# Patient Record
Sex: MALE | Race: WHITE | NOT HISPANIC OR LATINO | URBAN - METROPOLITAN AREA
[De-identification: names, ages, dates, MRNs, and addresses within clinical notes are randomized per-mention and may not be internally consistent; named-entity substitution may affect disease eponyms.]

---

## 2017-12-14 ENCOUNTER — EMERGENCY (EMERGENCY)
Facility: HOSPITAL | Age: 82
LOS: 1 days | Discharge: HOME | End: 2017-12-14
Attending: EMERGENCY MEDICINE | Admitting: EMERGENCY MEDICINE
Payer: MEDICARE

## 2017-12-14 VITALS
DIASTOLIC BLOOD PRESSURE: 89 MMHG | OXYGEN SATURATION: 97 % | RESPIRATION RATE: 18 BRPM | TEMPERATURE: 98 F | SYSTOLIC BLOOD PRESSURE: 187 MMHG | HEART RATE: 57 BPM

## 2017-12-14 VITALS
RESPIRATION RATE: 18 BRPM | TEMPERATURE: 98 F | OXYGEN SATURATION: 92 % | HEART RATE: 69 BPM | SYSTOLIC BLOOD PRESSURE: 182 MMHG | DIASTOLIC BLOOD PRESSURE: 89 MMHG

## 2017-12-14 LAB
ALBUMIN SERPL ELPH-MCNC: 4.3 G/DL — SIGNIFICANT CHANGE UP (ref 3.3–5)
ALP SERPL-CCNC: 69 U/L — SIGNIFICANT CHANGE UP (ref 40–120)
ALT FLD-CCNC: 10 U/L — SIGNIFICANT CHANGE UP (ref 4–41)
AST SERPL-CCNC: 19 U/L — SIGNIFICANT CHANGE UP (ref 4–40)
BASE EXCESS BLDV CALC-SCNC: 4.8 MMOL/L — SIGNIFICANT CHANGE UP
BASOPHILS # BLD AUTO: 0.1 K/UL — SIGNIFICANT CHANGE UP (ref 0–0.2)
BASOPHILS NFR BLD AUTO: 1.7 % — SIGNIFICANT CHANGE UP (ref 0–2)
BILIRUB SERPL-MCNC: 0.9 MG/DL — SIGNIFICANT CHANGE UP (ref 0.2–1.2)
BLOOD GAS VENOUS - CREATININE: 1.31 MG/DL — HIGH (ref 0.5–1.3)
BUN SERPL-MCNC: 31 MG/DL — HIGH (ref 7–23)
CALCIUM SERPL-MCNC: 8.5 MG/DL — SIGNIFICANT CHANGE UP (ref 8.4–10.5)
CHLORIDE BLDV-SCNC: 105 MMOL/L — SIGNIFICANT CHANGE UP (ref 96–108)
CHLORIDE SERPL-SCNC: 101 MMOL/L — SIGNIFICANT CHANGE UP (ref 98–107)
CO2 SERPL-SCNC: 28 MMOL/L — SIGNIFICANT CHANGE UP (ref 22–31)
CREAT SERPL-MCNC: 1.44 MG/DL — HIGH (ref 0.5–1.3)
EOSINOPHIL # BLD AUTO: 0.21 K/UL — SIGNIFICANT CHANGE UP (ref 0–0.5)
EOSINOPHIL NFR BLD AUTO: 3.5 % — SIGNIFICANT CHANGE UP (ref 0–6)
GAS PNL BLDV: 138 MMOL/L — SIGNIFICANT CHANGE UP (ref 136–146)
GLUCOSE BLDV-MCNC: 87 — SIGNIFICANT CHANGE UP (ref 70–99)
GLUCOSE SERPL-MCNC: 86 MG/DL — SIGNIFICANT CHANGE UP (ref 70–99)
HBA1C BLD-MCNC: 6.3 % — HIGH (ref 4–5.6)
HCO3 BLDV-SCNC: 27 MMOL/L — SIGNIFICANT CHANGE UP (ref 20–27)
HCT VFR BLD CALC: 36.4 % — LOW (ref 39–50)
HCT VFR BLDV CALC: 34.1 % — LOW (ref 39–51)
HGB BLD-MCNC: 11.3 G/DL — LOW (ref 13–17)
HGB BLDV-MCNC: 11.1 G/DL — LOW (ref 13–17)
IMM GRANULOCYTES # BLD AUTO: 0.02 # — SIGNIFICANT CHANGE UP
IMM GRANULOCYTES NFR BLD AUTO: 0.3 % — SIGNIFICANT CHANGE UP (ref 0–1.5)
INR BLD: 1.86 — HIGH (ref 0.88–1.17)
LACTATE BLDV-MCNC: 1.2 MMOL/L — SIGNIFICANT CHANGE UP (ref 0.5–2)
LYMPHOCYTES # BLD AUTO: 0.9 K/UL — LOW (ref 1–3.3)
LYMPHOCYTES # BLD AUTO: 14.9 % — SIGNIFICANT CHANGE UP (ref 13–44)
MCHC RBC-ENTMCNC: 27.8 PG — SIGNIFICANT CHANGE UP (ref 27–34)
MCHC RBC-ENTMCNC: 31 % — LOW (ref 32–36)
MCV RBC AUTO: 89.7 FL — SIGNIFICANT CHANGE UP (ref 80–100)
MONOCYTES # BLD AUTO: 0.56 K/UL — SIGNIFICANT CHANGE UP (ref 0–0.9)
MONOCYTES NFR BLD AUTO: 9.2 % — SIGNIFICANT CHANGE UP (ref 2–14)
NEUTROPHILS # BLD AUTO: 4.27 K/UL — SIGNIFICANT CHANGE UP (ref 1.8–7.4)
NEUTROPHILS NFR BLD AUTO: 70.4 % — SIGNIFICANT CHANGE UP (ref 43–77)
NRBC # FLD: 0 — SIGNIFICANT CHANGE UP
PCO2 BLDV: 53 MMHG — HIGH (ref 41–51)
PH BLDV: 7.37 PH — SIGNIFICANT CHANGE UP (ref 7.32–7.43)
PLATELET # BLD AUTO: 192 K/UL — SIGNIFICANT CHANGE UP (ref 150–400)
PMV BLD: 10.8 FL — SIGNIFICANT CHANGE UP (ref 7–13)
PO2 BLDV: 35 MMHG — SIGNIFICANT CHANGE UP (ref 35–40)
POTASSIUM BLDV-SCNC: 4.5 MMOL/L — SIGNIFICANT CHANGE UP (ref 3.4–4.5)
POTASSIUM SERPL-MCNC: 3.8 MMOL/L — SIGNIFICANT CHANGE UP (ref 3.5–5.3)
POTASSIUM SERPL-SCNC: 3.8 MMOL/L — SIGNIFICANT CHANGE UP (ref 3.5–5.3)
PROT SERPL-MCNC: 8.3 G/DL — SIGNIFICANT CHANGE UP (ref 6–8.3)
PROTHROM AB SERPL-ACNC: 20.9 SEC — HIGH (ref 9.8–13.1)
RBC # BLD: 4.06 M/UL — LOW (ref 4.2–5.8)
RBC # FLD: 16 % — HIGH (ref 10.3–14.5)
SAO2 % BLDV: 60.1 % — SIGNIFICANT CHANGE UP (ref 60–85)
SODIUM SERPL-SCNC: 143 MMOL/L — SIGNIFICANT CHANGE UP (ref 135–145)
TROPONIN T SERPL-MCNC: < 0.06 NG/ML — SIGNIFICANT CHANGE UP (ref 0–0.06)
WBC # BLD: 6.06 K/UL — SIGNIFICANT CHANGE UP (ref 3.8–10.5)
WBC # FLD AUTO: 6.06 K/UL — SIGNIFICANT CHANGE UP (ref 3.8–10.5)

## 2017-12-14 PROCEDURE — 99218: CPT | Mod: GC

## 2017-12-14 PROCEDURE — 71020: CPT | Mod: 26

## 2017-12-14 PROCEDURE — 70450 CT HEAD/BRAIN W/O DYE: CPT | Mod: 26

## 2017-12-14 RX ORDER — METOPROLOL TARTRATE 50 MG
25 TABLET ORAL ONCE
Qty: 0 | Refills: 0 | Status: COMPLETED | OUTPATIENT
Start: 2017-12-14 | End: 2017-12-14

## 2017-12-14 RX ORDER — FUROSEMIDE 40 MG
40 TABLET ORAL ONCE
Qty: 0 | Refills: 0 | Status: COMPLETED | OUTPATIENT
Start: 2017-12-14 | End: 2017-12-14

## 2017-12-14 RX ADMIN — Medication 25 MILLIGRAM(S): at 10:09

## 2017-12-14 RX ADMIN — Medication 40 MILLIGRAM(S): at 10:09

## 2017-12-14 NOTE — ED CDU PROVIDER INITIAL DAY NOTE - PROGRESS NOTE DETAILS
pt axox3. Neurovascullary intact. CN 2-12 intact. EOMs intact. 5/5 strength of UE and LE b/l. No pronator drift. Ambulatory without assistance. Sensation intact throughout. sent to CDU for neuro checks, abd observation for few hours, plan to d/c.

## 2017-12-14 NOTE — ED CDU PROVIDER DISPOSITION NOTE - CLINICAL COURSE
83 y/o male with h/o htn, hld, afib xarelto, dm s/p mech fall with head trauma, no loc, no neuro deficits.  Neg head ct in Main ED.  Sent to CDU for neuro checks.  No ha, neuro deficits while in CDU.  Discharged home.

## 2017-12-14 NOTE — ED PROVIDER NOTE - MEDICAL DECISION MAKING DETAILS
Fall on xarelto, normal exam- will do ct head, labs, cxr 2/2 hypoxia at home. Pt in NAD, asymptomatic

## 2017-12-14 NOTE — ED CDU PROVIDER INITIAL DAY NOTE - MEDICAL DECISION MAKING DETAILS
81 y/o male on xarelto s/p mech fall with head trauma, neg CT head in ED, here for neuro checks, antic dc at approx 6pm.

## 2017-12-14 NOTE — ED PROVIDER NOTE - OBJECTIVE STATEMENT
82 year old male, PMHx of HTN, HLD, Afib on Xarelto, DM; presents to the ED s/p fall. Patient states he came downstairs from bed early this morning. He was sitting in a folding chair and fell asleep. He awoke as he was falling sideways to the floor. He landed on his side, hitting the right side of his head on the wood floor. He remained conscious and awake throughout the fall and after impact. He called EMS to help him get up. Upon arrival- EMS found him to be hypertensive 200s/100s, and O2 sat in the mid 80s. Patient states he used to be on home 02 after a discharge from [place] but has not continued to use it. He states he has otherwise been feeling well, has not taken medications today. He denies fevers, chills, nausea, vomiting, diarrhea, numbness, tingling, weakness, headache, change in vision/hearing, or other complaints. Pt currently without complaints or symptoms.

## 2017-12-14 NOTE — ED CDU PROVIDER INITIAL DAY NOTE - MUSCULOSKELETAL, MLM
Spine appears normal, range of motion is not limited, no muscle or joint tenderness.  Chronic skin changes to b/l lower extremities

## 2017-12-14 NOTE — ED PROVIDER NOTE - PROGRESS NOTE DETAILS
PA Baseil: Pt labs wnl, ct head no acute findings. Pt has remained in low 90s pulse ox, likely chronic, pt in NAD. Pt agreeable to CDU for neuro checks and likely later discharge.

## 2017-12-14 NOTE — ED CDU PROVIDER DISPOSITION NOTE - ATTENDING CONTRIBUTION TO CARE
CDU MD TRUONG:  I performed a face to face bedside interview with patient regarding history of present illness, review of symptoms and past medical history. I completed an independent physical exam.  I have discussed patient's plan of care with PA.   I agree with note as stated above, having amended the EMR as needed to reflect my findings. I have discussed the assessment and plan of care.  This includes during the time I functioned as the attending physician for this patient.    This note was written by myself.

## 2017-12-14 NOTE — ED ADULT NURSE NOTE - OBJECTIVE STATEMENT
Pt rc'd to room 11 by EMS post fall this morning. Pt states he was sitting in his folding chair and "must have fell asleep when he fell over" from his chair and hit his head, patient states he did not lose consciousness. Pt A&Ox3, ambulatory, lives alone, hx of diabetes, HTN, HLD, A fib currently takes xarelto. Pt denies CP, SOB, headache, dizziness, vision changes, nausea, vomiting. Pt has ROM in all extremities with no obvious deformities, skin is intact. MD evaluated, IV 20G placed to L wrist, bloods drawn & sent, A fib on monitor, will monitor Pt rc'd to room 11 by EMS post fall this morning. Pt states he was sitting in his folding chair and "must have fell asleep when he fell over" from his chair and hit his head, patient states he did not lose consciousness. Pt A&Ox3, ambulatory, lives alone, hx of diabetes, HTN, HLD, A fib currently takes xarelto. Pt denies CP, SOB, headache, dizziness, vision changes, nausea, vomiting. Pt has ROM in all extremities with no obvious deformities, skin is intact. Pt awake& alert, no neuro deficits, pupils equal & reactive. Baseline sat was 88%, fingertips slightly discolored, MD evaluated, pt in NAD, respirations even & unlabored. IV 20G placed to L wrist, bloods drawn & sent, A fib on monitor, will monitor

## 2017-12-14 NOTE — ED PROVIDER NOTE - ATTENDING CONTRIBUTION TO CARE
ED Attending (Jose Antonio OLSON): I have personally performed a face to face bedside history and physical examination of this patient. I have discussed the history, examination, assessment and plan of management with the Physician Assistant. My findings include: 82 year old male, PMHx of HTN, HLD, Afib on Xarelto, DM, previously on home O2 but d/nila it himself, presents to the ED s/p fall. Patient states he came downstairs from bed early this morning. He was sitting in a folding chair and fell asleep. He awoke as he was falling sideways to the floor. He landed on his side, hitting the right side of his head on the wood floor. He remained conscious and awake throughout the fall and after impact. He called EMS to help him get up. Upon arrival- EMS found him to be hypertensive 200s/100s, and O2 sat in the mid 80s. Patient states he used to be on home 02 for several months after a discharge from hospital, but has not continued to use it. He states he has otherwise been feeling well, has not taken medications today. He denies fevers, chills, nausea, vomiting, diarrhea, numbness, tingling, weakness, headache, change in vision/hearing, or other complaints. Pt currently without complaints or symptoms. On exam: in no distress, normal mental status, VS as noted sat 89% RA, no resp distress, elevated BP noted. No evidence of head injury, non focal neuro exam, lungs clear heart sounds normal. IMP mechanical fall in elderly male on Xeralto for a fib and h/o other chronic medical problems. We will check labs, EKG CT head and CXR

## 2017-12-14 NOTE — ED PROVIDER NOTE - CHPI ED SYMPTOMS NEG
no fever/no tingling/no weakness/no abrasion/no confusion/no bleeding/no loss of consciousness/no numbness/no deformity/no vomiting

## 2017-12-14 NOTE — ED CDU PROVIDER INITIAL DAY NOTE - ATTENDING CONTRIBUTION TO CARE
CDU MD TRUONG:  I performed a face to face bedside interview with patient regarding history of present illness, review of symptoms and past medical history. I completed an independent physical exam.  I have discussed patient's plan of care with PA.   I agree with note as stated above, having amended the EMR as needed to reflect my findings. I have discussed the assessment and plan of care.  This includes during the time I functioned as the attending physician for this patient.    HPI / ROS / PE / MDM written by myself.

## 2017-12-14 NOTE — ED CDU PROVIDER INITIAL DAY NOTE - OBJECTIVE STATEMENT
81 y/o male on xarelto for afib here after mechanical fall with head trauma.  Denies loc, vision changes, ha, neck pain, numbness/tingling/weakness to arms/legs, balance problems.  CT head negative for ich in ED, sent to CDU for neuro-checks.

## 2018-05-25 ENCOUNTER — EMERGENCY (EMERGENCY)
Facility: HOSPITAL | Age: 83
LOS: 1 days | Discharge: HOME | End: 2018-05-25
Attending: EMERGENCY MEDICINE | Admitting: EMERGENCY MEDICINE
Payer: MEDICARE

## 2018-05-25 VITALS
HEART RATE: 51 BPM | SYSTOLIC BLOOD PRESSURE: 138 MMHG | OXYGEN SATURATION: 100 % | TEMPERATURE: 99 F | RESPIRATION RATE: 16 BRPM | DIASTOLIC BLOOD PRESSURE: 74 MMHG

## 2018-05-25 VITALS
SYSTOLIC BLOOD PRESSURE: 137 MMHG | OXYGEN SATURATION: 95 % | DIASTOLIC BLOOD PRESSURE: 78 MMHG | HEART RATE: 53 BPM | RESPIRATION RATE: 20 BRPM

## 2018-05-25 LAB
ALBUMIN SERPL ELPH-MCNC: 3.7 G/DL — SIGNIFICANT CHANGE UP (ref 3.3–5)
ALP SERPL-CCNC: 67 U/L — SIGNIFICANT CHANGE UP (ref 40–120)
ALT FLD-CCNC: 8 U/L — SIGNIFICANT CHANGE UP (ref 4–41)
APTT BLD: 45.3 SEC — HIGH (ref 27.5–37.4)
AST SERPL-CCNC: 16 U/L — SIGNIFICANT CHANGE UP (ref 4–40)
BASE EXCESS BLDV CALC-SCNC: 6.2 MMOL/L — SIGNIFICANT CHANGE UP
BASOPHILS # BLD AUTO: 0.07 K/UL — SIGNIFICANT CHANGE UP (ref 0–0.2)
BASOPHILS NFR BLD AUTO: 1.3 % — SIGNIFICANT CHANGE UP (ref 0–2)
BILIRUB SERPL-MCNC: 0.7 MG/DL — SIGNIFICANT CHANGE UP (ref 0.2–1.2)
BLD GP AB SCN SERPL QL: NEGATIVE — SIGNIFICANT CHANGE UP
BLOOD GAS VENOUS - CREATININE: 1.37 MG/DL — HIGH (ref 0.5–1.3)
BUN SERPL-MCNC: 27 MG/DL — HIGH (ref 7–23)
CALCIUM SERPL-MCNC: 8.7 MG/DL — SIGNIFICANT CHANGE UP (ref 8.4–10.5)
CHLORIDE BLDV-SCNC: 103 MMOL/L — SIGNIFICANT CHANGE UP (ref 96–108)
CHLORIDE SERPL-SCNC: 101 MMOL/L — SIGNIFICANT CHANGE UP (ref 98–107)
CO2 SERPL-SCNC: 31 MMOL/L — SIGNIFICANT CHANGE UP (ref 22–31)
CREAT SERPL-MCNC: 1.46 MG/DL — HIGH (ref 0.5–1.3)
EOSINOPHIL # BLD AUTO: 0.22 K/UL — SIGNIFICANT CHANGE UP (ref 0–0.5)
EOSINOPHIL NFR BLD AUTO: 4 % — SIGNIFICANT CHANGE UP (ref 0–6)
GAS PNL BLDV: 140 MMOL/L — SIGNIFICANT CHANGE UP (ref 136–146)
GLUCOSE BLDV-MCNC: 107 — HIGH (ref 70–99)
GLUCOSE SERPL-MCNC: 112 MG/DL — HIGH (ref 70–99)
HCO3 BLDV-SCNC: 28 MMOL/L — HIGH (ref 20–27)
HCT VFR BLD CALC: 34.1 % — LOW (ref 39–50)
HCT VFR BLDV CALC: 33.5 % — LOW (ref 39–51)
HGB BLD-MCNC: 10.8 G/DL — LOW (ref 13–17)
HGB BLDV-MCNC: 10.9 G/DL — LOW (ref 13–17)
IMM GRANULOCYTES # BLD AUTO: 0.01 # — SIGNIFICANT CHANGE UP
IMM GRANULOCYTES NFR BLD AUTO: 0.2 % — SIGNIFICANT CHANGE UP (ref 0–1.5)
INR BLD: 2.15 — HIGH (ref 0.88–1.17)
LACTATE BLDV-MCNC: 1.8 MMOL/L — SIGNIFICANT CHANGE UP (ref 0.5–2)
LYMPHOCYTES # BLD AUTO: 0.72 K/UL — LOW (ref 1–3.3)
LYMPHOCYTES # BLD AUTO: 13.1 % — SIGNIFICANT CHANGE UP (ref 13–44)
MAGNESIUM SERPL-MCNC: 2.1 MG/DL — SIGNIFICANT CHANGE UP (ref 1.6–2.6)
MCHC RBC-ENTMCNC: 28.9 PG — SIGNIFICANT CHANGE UP (ref 27–34)
MCHC RBC-ENTMCNC: 31.7 % — LOW (ref 32–36)
MCV RBC AUTO: 91.2 FL — SIGNIFICANT CHANGE UP (ref 80–100)
MONOCYTES # BLD AUTO: 0.59 K/UL — SIGNIFICANT CHANGE UP (ref 0–0.9)
MONOCYTES NFR BLD AUTO: 10.8 % — SIGNIFICANT CHANGE UP (ref 2–14)
NEUTROPHILS # BLD AUTO: 3.87 K/UL — SIGNIFICANT CHANGE UP (ref 1.8–7.4)
NEUTROPHILS NFR BLD AUTO: 70.6 % — SIGNIFICANT CHANGE UP (ref 43–77)
NRBC # FLD: 0 — SIGNIFICANT CHANGE UP
PCO2 BLDV: 62 MMHG — HIGH (ref 41–51)
PH BLDV: 7.33 PH — SIGNIFICANT CHANGE UP (ref 7.32–7.43)
PHOSPHATE SERPL-MCNC: 2.4 MG/DL — LOW (ref 2.5–4.5)
PLATELET # BLD AUTO: 166 K/UL — SIGNIFICANT CHANGE UP (ref 150–400)
PMV BLD: 10.9 FL — SIGNIFICANT CHANGE UP (ref 7–13)
PO2 BLDV: 30 MMHG — LOW (ref 35–40)
POTASSIUM BLDV-SCNC: 3.5 MMOL/L — SIGNIFICANT CHANGE UP (ref 3.4–4.5)
POTASSIUM SERPL-MCNC: 3.5 MMOL/L — SIGNIFICANT CHANGE UP (ref 3.5–5.3)
POTASSIUM SERPL-SCNC: 3.5 MMOL/L — SIGNIFICANT CHANGE UP (ref 3.5–5.3)
PROT SERPL-MCNC: 7.5 G/DL — SIGNIFICANT CHANGE UP (ref 6–8.3)
PROTHROM AB SERPL-ACNC: 25.1 SEC — HIGH (ref 9.8–13.1)
RBC # BLD: 3.74 M/UL — LOW (ref 4.2–5.8)
RBC # FLD: 15.2 % — HIGH (ref 10.3–14.5)
RH IG SCN BLD-IMP: POSITIVE — SIGNIFICANT CHANGE UP
SAO2 % BLDV: 46.5 % — LOW (ref 60–85)
SODIUM SERPL-SCNC: 141 MMOL/L — SIGNIFICANT CHANGE UP (ref 135–145)
TROPONIN T SERPL-MCNC: < 0.06 NG/ML — SIGNIFICANT CHANGE UP (ref 0–0.06)
WBC # BLD: 5.48 K/UL — SIGNIFICANT CHANGE UP (ref 3.8–10.5)
WBC # FLD AUTO: 5.48 K/UL — SIGNIFICANT CHANGE UP (ref 3.8–10.5)

## 2018-05-25 PROCEDURE — 12011 RPR F/E/E/N/L/M 2.5 CM/<: CPT | Mod: GC

## 2018-05-25 PROCEDURE — 99285 EMERGENCY DEPT VISIT HI MDM: CPT | Mod: 25,GC

## 2018-05-25 PROCEDURE — 72125 CT NECK SPINE W/O DYE: CPT | Mod: 26

## 2018-05-25 PROCEDURE — 70450 CT HEAD/BRAIN W/O DYE: CPT | Mod: 26

## 2018-05-25 PROCEDURE — 71046 X-RAY EXAM CHEST 2 VIEWS: CPT | Mod: 26

## 2018-05-25 PROCEDURE — 93010 ELECTROCARDIOGRAM REPORT: CPT | Mod: 59

## 2018-05-25 NOTE — ED ADULT NURSE NOTE - OBJECTIVE STATEMENT
pt received alert and oriented 3. pt has pmhx of afib on Xarelto. pt is s/p mechanical fall this morning. states that chair fell out from underneath him. pt states he fells and hit the right side of his head and arm. pt denies any loc. pt has right forehead hematoma ,and right elbow skin tear. pt denies pt received alert and oriented 3. pt has pmhx of afib on Xarelto. pt is s/p mechanical fall this morning. states that chair fell out from underneath him. pt states he fells and hit the right side of his head and arm. pt denies any loc. pt has right forehead hematoma.no active bleeding noted  ,and right elbow skin tear. pt denies any chest pain, sob, n/v/d, dizziness, lightheadedness. 20 g placed in left a/c. labs drawn and sent. pt placed on continuous tele monitoring .afib on cm. md at bedside for eval. will continue to monitor. pt received alert and oriented 3. pt has pmhx of afib on Xarelto. pt is s/p mechanical fall this morning. states that chair fell out from underneath him. pt states he fells and hit the right side of his head and arm. pt denies any loc. pt has right forehead hematoma. no active bleeding noted  ,and right elbow skin tear. pt noted to have bilateral leg edema  with dry ,scaly skin. Postitive pedal pulses. pt denies any chest pain, sob, n/v/d, dizziness, lightheadedness. 20 g placed in left a/c. labs drawn and sent. pt placed on continuous tele monitoring .afib on cm. md at bedside for eval. will continue to monitor.

## 2018-05-25 NOTE — ED PROVIDER NOTE - ATTENDING CONTRIBUTION TO CARE
I performed a history and physical exam of the patient and discussed their management with the resident and /or advanced care provider. I reviewed the resident and /or ACP's note and agree with the documented findings and plan of care. My medical decison making and observations are found above.  Neck non tender, rt hematoma on forhead/scalp. no neuro changes. Patient is bradycardic to 51

## 2018-05-25 NOTE — ED PROVIDER NOTE - OBJECTIVE STATEMENT
82m w hx afib on Xarelto, HTN, HLD, DM, here after fall at home. Pt states was going to sit down in chair when "chair gave out" and he fell onto his right side, hitting his head. No LOC. Denies preceding cp, SOB, or lightheadedness. No h/a, visual changes, or vomiting since. C/o r sided neck pain but no pain elsewhere. No recent fever or illness.  Pt noted to be sakshi to 40s-50s on monitor, w O2 93% on RA; previous chart review significant for similar vitals in the past.

## 2018-05-25 NOTE — ED PROVIDER NOTE - CARE PLAN
Principal Discharge DX:	Fall, initial encounter  Assessment and plan of treatment:	You were seen in the emergency department for a fall. You had a CT scan of your head that was unremarkable. Please follow up with your primary doctor in the next 2-3 days. Do not take your metoprolol today for your relatively slow heart rate. Return to the emergency department immediately if you experience vomiting, changes in your vision, loss of consciousness, or any other concerning symptoms.

## 2018-05-25 NOTE — PROVIDER CONTACT NOTE (OTHER) - ASSESSMENT
Medical team referred this case as pt needs a ride home. Case was discussed with the medical team and  requesting BLS  due to fall today.  Writer called [facility] [phone] spoke with Ms. Che to set up ambulance service and pt will be picked up around 4:30 pm by [facility] trip # [id number] as per MsRashad Collins. Non Emergent ambulance form has been signed by MD and attached to the pts envelope for EMS. EMS reported that pts has bed but no mattress and dirty. Writer contacted pst son Stephon [phone] who informed " bed without mattress is his mom and she passed away but his father has no issues and he has beds and he agreed to follow up with the father and has good supprt in [facility]". After this pts son spoke with attending Physician. Case was discussed with the medical team and they have no further concern and no need for APS at this time.  Medical team and pts son Stephon [phone] was notified  time.

## 2018-05-25 NOTE — ED PROVIDER NOTE - PLAN OF CARE
You were seen in the emergency department for a fall. You had a CT scan of your head that was unremarkable. Please follow up with your primary doctor in the next 2-3 days. Do not take your metoprolol today for your relatively slow heart rate. Return to the emergency department immediately if you experience vomiting, changes in your vision, loss of consciousness, or any other concerning symptoms.

## 2018-05-25 NOTE — ED PROVIDER NOTE - MEDICAL DECISION MAKING DETAILS
Cailin: Patient in chair states he fell asleep and fell out of chair striking head. Patient does not discribe syncope, has had falls before. No chest pain, Head trauma and on xeralta.  Will get head ct as he is high risk for bleed.

## 2018-10-26 NOTE — ED PROVIDER NOTE - PROGRESS NOTE
Patient not feeling comfortable about going home due to unsteady gait, rec to get PT to re-eval patient  C/w amlodipine 2.5mg upon discharge for HTN, advised patient to f/up with PMD after discharge  Rest as above Improved.

## 2019-08-16 NOTE — ED ADULT NURSE REASSESSMENT NOTE - NURSING NEURO ORIENTATION
oriented to person, place and time I have personally performed a face to face diagnostic evaluation on this patient. I have reviewed the ACP note and agree with the history, exam and plan of care, except as noted.

## 2023-05-05 NOTE — ED PROCEDURE NOTE - NS ED PROC PERFORMED BY1 FT
You had your first Leqvio injection today. .  Please make your 3 month visit for the next injection. Please refer to your information for side effects. Any questions, please call your doctor.
Goldberger, Elizabeth MD
(4) no impairment

## 2023-06-04 NOTE — ED PROVIDER NOTE - CROS ED GU ALL NEG
Today your imaging shows spinal arthritis as we discussed with no acute abnormalities.  Please use muscle relaxers and anti-inflammatories as needed, topical lidocaine or Biofreeze.  Please apply heat to your back followed by gentle range of motion stretching.  He will need to follow-up with your primary care provider within the next 48 hours for close reevaluation as well as to discuss physical therapy however should you develop any new or worsening symptoms please return to the ER for further evaluation.  Incidentally you have a 1.7 cm lesion on your right kidney for which you will need to follow-up with your primary care provider for an ultrasound.   negative...